# Patient Record
Sex: FEMALE | Race: WHITE
[De-identification: names, ages, dates, MRNs, and addresses within clinical notes are randomized per-mention and may not be internally consistent; named-entity substitution may affect disease eponyms.]

---

## 2018-04-29 ENCOUNTER — HOSPITAL ENCOUNTER (INPATIENT)
Dept: HOSPITAL 26 - MLD | Age: 33
LOS: 2 days | Discharge: HOME | End: 2018-05-01
Payer: SELF-PAY

## 2018-04-29 VITALS — WEIGHT: 160 LBS | HEIGHT: 62.99 IN | BODY MASS INDEX: 28.35 KG/M2

## 2018-04-29 VITALS — SYSTOLIC BLOOD PRESSURE: 103 MMHG | DIASTOLIC BLOOD PRESSURE: 83 MMHG

## 2018-04-29 DIAGNOSIS — Z83.3: ICD-10-CM

## 2018-04-29 DIAGNOSIS — Z3A.39: ICD-10-CM

## 2018-04-29 LAB
APPEARANCE UR: (no result)
BASOPHILS # BLD AUTO: 0 K/UL (ref 0–0.22)
BASOPHILS NFR BLD AUTO: 0.2 % (ref 0–2)
BILIRUB UR QL STRIP: NEGATIVE
COLOR UR: YELLOW
EOSINOPHIL # BLD AUTO: 0.1 K/UL (ref 0–0.4)
EOSINOPHIL NFR BLD AUTO: 1.1 % (ref 0–4)
ERYTHROCYTE [DISTWIDTH] IN BLOOD BY AUTOMATED COUNT: 14.4 % (ref 11.6–13.7)
GLUCOSE UR STRIP-MCNC: NEGATIVE MG/DL
HCT VFR BLD AUTO: 39.8 % (ref 36–48)
HGB BLD-MCNC: 13.9 G/DL (ref 12–16)
HGB UR QL STRIP: NEGATIVE
LEUKOCYTE ESTERASE UR QL STRIP: (no result)
LYMPHOCYTES # BLD AUTO: 1.9 K/UL (ref 2.5–16.5)
LYMPHOCYTES NFR BLD AUTO: 17.1 % (ref 20.5–51.1)
MCH RBC QN AUTO: 29 PG (ref 27–31)
MCHC RBC AUTO-ENTMCNC: 35 G/DL (ref 33–37)
MCV RBC AUTO: 84.4 FL (ref 80–94)
MONOCYTES # BLD AUTO: 1 K/UL (ref 0.8–1)
MONOCYTES NFR BLD AUTO: 8.9 % (ref 1.7–9.3)
NEUTROPHILS # BLD AUTO: 8.2 K/UL (ref 1.8–7.7)
NEUTROPHILS NFR BLD AUTO: 72.7 % (ref 42.2–75.2)
NITRITE UR QL STRIP: NEGATIVE
PH UR STRIP: 7.5 [PH] (ref 5–9)
PLATELET # BLD AUTO: 262 K/UL (ref 140–450)
RBC # BLD AUTO: 4.72 MIL/UL (ref 4.2–5.4)
RBC #/AREA URNS HPF: (no result) /HPF (ref 0–5)
WBC # BLD AUTO: 11.3 K/UL (ref 4.8–10.8)
WBC,URINE: (no result) /HPF (ref 0–5)

## 2018-04-30 PROCEDURE — 00HU33Z INSERTION OF INFUSION DEVICE INTO SPINAL CANAL, PERCUTANEOUS APPROACH: ICD-10-PCS

## 2018-04-30 PROCEDURE — 3E0R3BZ INTRODUCTION OF ANESTHETIC AGENT INTO SPINAL CANAL, PERCUTANEOUS APPROACH: ICD-10-PCS

## 2018-04-30 PROCEDURE — 10907ZC DRAINAGE OF AMNIOTIC FLUID, THERAPEUTIC FROM PRODUCTS OF CONCEPTION, VIA NATURAL OR ARTIFICIAL OPENING: ICD-10-PCS

## 2018-04-30 RX ADMIN — HYDROCODONE BITARTRATE AND ACETAMINOPHEN PRN TAB: 5; 325 TABLET ORAL at 11:57

## 2018-04-30 RX ADMIN — HYDROCODONE BITARTRATE AND ACETAMINOPHEN PRN TAB: 5; 325 TABLET ORAL at 20:35

## 2018-04-30 NOTE — NUR
PATIENT HAS BEEN SCREENED AND CATEGORIZED AS LOW NUTRITION RISK. PATIENT WILL BE SEEN WITHIN 
7 DAYS OF ADMISSION.



5/6/18



NBA REN RD

## 2018-05-01 LAB
HCT VFR BLD AUTO: 39.4 % (ref 36–48)
HGB BLD-MCNC: 13.1 G/DL (ref 12–16)

## 2018-05-01 RX ADMIN — HYDROCODONE BITARTRATE AND ACETAMINOPHEN PRN TAB: 5; 325 TABLET ORAL at 08:29
